# Patient Record
Sex: FEMALE
[De-identification: names, ages, dates, MRNs, and addresses within clinical notes are randomized per-mention and may not be internally consistent; named-entity substitution may affect disease eponyms.]

---

## 2023-09-18 PROBLEM — F31.9 BIPOLAR 1 DISORDER (HCC): Status: ACTIVE | Noted: 2022-09-23

## 2023-09-18 PROBLEM — R10.84 GENERALIZED ABDOMINAL PAIN: Status: ACTIVE | Noted: 2023-09-18

## 2023-09-18 RX ORDER — PSEUDOEPHEDRINE HCL 30 MG
100 TABLET ORAL DAILY
COMMUNITY
Start: 2022-08-19 | End: 2023-09-19

## 2023-09-18 RX ORDER — LAMOTRIGINE 100 MG/1
2 TABLET ORAL EVERY MORNING
COMMUNITY
Start: 2022-04-28

## 2023-09-18 RX ORDER — ETONOGESTREL AND ETHINYL ESTRADIOL 11.7; 2.7 MG/1; MG/1
1 INSERT, EXTENDED RELEASE VAGINAL
COMMUNITY
End: 2023-09-19

## 2023-09-18 RX ORDER — OLMESARTAN MEDOXOMIL 20 MG/1
20 TABLET ORAL DAILY
COMMUNITY
Start: 2020-09-28 | End: 2023-09-19

## 2023-09-18 RX ORDER — NORGESTIMATE AND ETHINYL ESTRADIOL 0.25-0.035
KIT ORAL
COMMUNITY
Start: 2021-06-01 | End: 2023-09-19

## 2023-09-18 RX ORDER — FLUTICASONE PROPIONATE 50 MCG
2 SPRAY, SUSPENSION (ML) NASAL DAILY
COMMUNITY
Start: 2019-03-08

## 2023-09-18 RX ORDER — HYDROCHLOROTHIAZIDE 25 MG/1
25 TABLET ORAL DAILY
COMMUNITY
Start: 2020-09-28 | End: 2023-09-19

## 2023-09-18 ASSESSMENT — ENCOUNTER SYMPTOMS: SHORTNESS OF BREATH: 0

## 2023-09-18 NOTE — ASSESSMENT & PLAN NOTE
Check STAT abdominal US  Referral to general surgery  Check CBC CMP TSH   Advised pt to cleanse umbilicus with antibacterial soap daily, and apply gauze to site

## 2023-09-19 ENCOUNTER — TELEPHONE (OUTPATIENT)
Facility: CLINIC | Age: 28
End: 2023-09-19

## 2023-09-19 ENCOUNTER — OFFICE VISIT (OUTPATIENT)
Facility: CLINIC | Age: 28
End: 2023-09-19
Payer: COMMERCIAL

## 2023-09-19 VITALS
DIASTOLIC BLOOD PRESSURE: 97 MMHG | OXYGEN SATURATION: 95 % | HEART RATE: 95 BPM | RESPIRATION RATE: 17 BRPM | SYSTOLIC BLOOD PRESSURE: 142 MMHG | BODY MASS INDEX: 42.75 KG/M2 | HEIGHT: 64 IN | WEIGHT: 250.4 LBS | TEMPERATURE: 97.9 F

## 2023-09-19 DIAGNOSIS — F31.9 BIPOLAR 1 DISORDER (HCC): ICD-10-CM

## 2023-09-19 DIAGNOSIS — Z76.89 ENCOUNTER TO ESTABLISH CARE: ICD-10-CM

## 2023-09-19 DIAGNOSIS — Z13.220 SCREENING FOR LIPID DISORDERS: ICD-10-CM

## 2023-09-19 DIAGNOSIS — I10 ESSENTIAL HYPERTENSION: ICD-10-CM

## 2023-09-19 DIAGNOSIS — Z23 NEEDS FLU SHOT: ICD-10-CM

## 2023-09-19 DIAGNOSIS — Z11.59 NEED FOR HEPATITIS C SCREENING TEST: ICD-10-CM

## 2023-09-19 DIAGNOSIS — N97.9 FEMALE FERTILITY PROBLEM: ICD-10-CM

## 2023-09-19 DIAGNOSIS — Z11.4 SCREENING FOR HIV (HUMAN IMMUNODEFICIENCY VIRUS): ICD-10-CM

## 2023-09-19 DIAGNOSIS — R10.84 GENERALIZED ABDOMINAL PAIN: Primary | ICD-10-CM

## 2023-09-19 PROCEDURE — 3077F SYST BP >= 140 MM HG: CPT

## 2023-09-19 PROCEDURE — 3080F DIAST BP >= 90 MM HG: CPT

## 2023-09-19 PROCEDURE — 1036F TOBACCO NON-USER: CPT

## 2023-09-19 PROCEDURE — 99205 OFFICE O/P NEW HI 60 MIN: CPT

## 2023-09-19 PROCEDURE — 90674 CCIIV4 VAC NO PRSV 0.5 ML IM: CPT

## 2023-09-19 PROCEDURE — G8417 CALC BMI ABV UP PARAM F/U: HCPCS

## 2023-09-19 PROCEDURE — 90471 IMMUNIZATION ADMIN: CPT

## 2023-09-19 PROCEDURE — G8427 DOCREV CUR MEDS BY ELIG CLIN: HCPCS

## 2023-09-19 RX ORDER — LABETALOL 200 MG/1
TABLET, FILM COATED ORAL
COMMUNITY
Start: 2023-09-13

## 2023-09-19 RX ORDER — LEVOTHYROXINE SODIUM 0.03 MG/1
TABLET ORAL
COMMUNITY
Start: 2023-09-14

## 2023-09-19 SDOH — ECONOMIC STABILITY: HOUSING INSECURITY
IN THE LAST 12 MONTHS, WAS THERE A TIME WHEN YOU DID NOT HAVE A STEADY PLACE TO SLEEP OR SLEPT IN A SHELTER (INCLUDING NOW)?: NO

## 2023-09-19 SDOH — ECONOMIC STABILITY: FOOD INSECURITY: WITHIN THE PAST 12 MONTHS, YOU WORRIED THAT YOUR FOOD WOULD RUN OUT BEFORE YOU GOT MONEY TO BUY MORE.: NEVER TRUE

## 2023-09-19 SDOH — ECONOMIC STABILITY: FOOD INSECURITY: WITHIN THE PAST 12 MONTHS, THE FOOD YOU BOUGHT JUST DIDN'T LAST AND YOU DIDN'T HAVE MONEY TO GET MORE.: NEVER TRUE

## 2023-09-19 SDOH — ECONOMIC STABILITY: INCOME INSECURITY: HOW HARD IS IT FOR YOU TO PAY FOR THE VERY BASICS LIKE FOOD, HOUSING, MEDICAL CARE, AND HEATING?: NOT VERY HARD

## 2023-09-19 ASSESSMENT — ENCOUNTER SYMPTOMS
ABDOMINAL PAIN: 1
VOMITING: 0
NAUSEA: 1
CONSTIPATION: 0

## 2023-09-19 ASSESSMENT — PATIENT HEALTH QUESTIONNAIRE - PHQ9
SUM OF ALL RESPONSES TO PHQ QUESTIONS 1-9: 14
SUM OF ALL RESPONSES TO PHQ QUESTIONS 1-9: 14
10. IF YOU CHECKED OFF ANY PROBLEMS, HOW DIFFICULT HAVE THESE PROBLEMS MADE IT FOR YOU TO DO YOUR WORK, TAKE CARE OF THINGS AT HOME, OR GET ALONG WITH OTHER PEOPLE: 1
5. POOR APPETITE OR OVEREATING: 1
3. TROUBLE FALLING OR STAYING ASLEEP: 3
7. TROUBLE CONCENTRATING ON THINGS, SUCH AS READING THE NEWSPAPER OR WATCHING TELEVISION: 2
9. THOUGHTS THAT YOU WOULD BE BETTER OFF DEAD, OR OF HURTING YOURSELF: 0
SUM OF ALL RESPONSES TO PHQ QUESTIONS 1-9: 14
1. LITTLE INTEREST OR PLEASURE IN DOING THINGS: 1
4. FEELING TIRED OR HAVING LITTLE ENERGY: 2
6. FEELING BAD ABOUT YOURSELF - OR THAT YOU ARE A FAILURE OR HAVE LET YOURSELF OR YOUR FAMILY DOWN: 2
8. MOVING OR SPEAKING SO SLOWLY THAT OTHER PEOPLE COULD HAVE NOTICED. OR THE OPPOSITE, BEING SO FIGETY OR RESTLESS THAT YOU HAVE BEEN MOVING AROUND A LOT MORE THAN USUAL: 1
SUM OF ALL RESPONSES TO PHQ QUESTIONS 1-9: 14
2. FEELING DOWN, DEPRESSED OR HOPELESS: 2
SUM OF ALL RESPONSES TO PHQ9 QUESTIONS 1 & 2: 3

## 2023-09-19 NOTE — PROGRESS NOTES
Alexandra Pozo is a 29 y.o. female is seen on 9/19/2023 for Establish Care, Abdominal Pain (Stomach pain started on 9/9/23, pain feels sore and in umbilical area. States belly button started to bleed on 9/10/23. Color fluctuates between being dark red to bright red. No warmthness in area. Pt has cramps intermittently. States area now has skin sticking out of belly button. pain is worse when bending down or picking up things. Currently treating area with antibacterial soap and cotton ball. Pain 7/10. States pain stays 10-15 minutes after lifting a 50 lb dog. Relieved when laying on left side), Depression, and Hypertension    Assessment & Plan:     1. Generalized abdominal pain  Assessment & Plan:  Check STAT abdominal US  Referral to general surgery  Check CBC CMP TSH   Advised pt to cleanse umbilicus with antibacterial soap daily, and apply gauze to site  Orders:  -     [facility] - [facility], General Surgery, [place]  -     CBC with Auto Differential; Future  -     US ABDOMEN COMPLETE; Future  2. Essential hypertension  Assessment & Plan:  BP acceptable, goal <130/80, continue regimen  3. Female fertility problem  Assessment & Plan:  Continue management per fertility specialist  4. Bipolar 1 disorder [facility])  Assessment & Plan:  Continue management per psychiatry  5. Screening for HIV (human immunodeficiency virus)  6. Need for hepatitis C screening test  7. Screening for lipid disorders  -     Comprehensive Metabolic Panel; Future  -     Lipid Panel; Future  8. Encounter to establish care  9. Needs flu shot  -     Influenza, FLUCELVAX, (age 10 mo+), IM, Preservative Free, 0.5 mL    Follow-up and Dispositions    Return in about 2 weeks (around 10/3/2023) for Physical, Abdominal pain, Lab results. Subjective:     HPI    Previous PCP: Dr. Mary Boas    Abdominal pain  Onset: 9/9/23  Location: belly button  Duration: constant  Characteristics: abdominal pain.  Felt sore, 9/10/23 belly button started
Obtained consent from patient. Per verbal order from NP  Sada Injection of flu administered. Verified by me and Jung Zavala that this is the correct immunization/injection. Patient observed for 15 minutes with no adverse reaction.
Sascha Anthony presents today for   Chief Complaint   Patient presents with    Establish Care    Abdominal Pain     Stomach pain started on 23, pain feels sore and in umbilical area. States belly button started to bleed on 9/10/23. Color fluctuates between being dark red to bright red. No warmthness in area. Pt has cramps intermittently. States area now has skin sticking out of belly button. pain is worse when bending down or picking up things. Currently treating area with antibacterial soap and cotton ball. Pain 7/10. States pain stays 10-15 minutes after lifting a 50 lb dog. Relieved when laying on left side    Depression    Hypertension       Is someone accompanying this pt? no    Is the patient using any DME equipment during OV? no    Depression Screenin/19/2023    10:34 AM   PHQ-9 Questionaire   Little interest or pleasure in doing things 1   Feeling down, depressed, or hopeless 2   Trouble falling or staying asleep, or sleeping too much 3   Feeling tired or having little energy 2   Poor appetite or overeating 1   Feeling bad about yourself - or that you are a failure or have let yourself or your family down 2   Trouble concentrating on things, such as reading the newspaper or watching television 2   Moving or speaking so slowly that other people could have noticed. Or the opposite - being so fidgety or restless that you have been moving around a lot more than usual 1   Thoughts that you would be better off dead, or of hurting yourself in some way 0   PHQ-9 Total Score 14   If you checked off any problems, how difficult have these problems made it for you to do your work, take care of things at home, or get along with other people? 1        ELMER 7-Anxiety        No data to display                 Learning Assessment:  Who is the primary learner? Patient    What is the preferred language for health care of the primary learner? ENGLISH    How does the primary learner prefer to learn new concepts?
42

## 2023-09-25 ENCOUNTER — HOSPITAL ENCOUNTER (OUTPATIENT)
Facility: HOSPITAL | Age: 28
Discharge: HOME | End: 2023-09-28
Payer: COMMERCIAL

## 2023-09-25 DIAGNOSIS — R10.84 GENERALIZED ABDOMINAL PAIN: ICD-10-CM

## 2023-09-25 PROCEDURE — 76700 US EXAM ABDOM COMPLETE: CPT

## 2023-10-02 PROBLEM — Z00.00 ANNUAL PHYSICAL EXAM: Status: ACTIVE | Noted: 2023-10-02

## 2023-10-04 ENCOUNTER — OFFICE VISIT (OUTPATIENT)
Age: 28
End: 2023-10-04
Payer: COMMERCIAL

## 2023-10-04 VITALS
BODY MASS INDEX: 43.02 KG/M2 | RESPIRATION RATE: 18 BRPM | SYSTOLIC BLOOD PRESSURE: 138 MMHG | HEART RATE: 86 BPM | HEIGHT: 64 IN | WEIGHT: 252 LBS | OXYGEN SATURATION: 97 % | TEMPERATURE: 98 F | DIASTOLIC BLOOD PRESSURE: 80 MMHG

## 2023-10-04 DIAGNOSIS — R19.8 UMBILICAL BLEEDING: Primary | ICD-10-CM

## 2023-10-04 PROCEDURE — G8427 DOCREV CUR MEDS BY ELIG CLIN: HCPCS | Performed by: SURGERY

## 2023-10-04 PROCEDURE — G8417 CALC BMI ABV UP PARAM F/U: HCPCS | Performed by: SURGERY

## 2023-10-04 PROCEDURE — 3078F DIAST BP <80 MM HG: CPT | Performed by: SURGERY

## 2023-10-04 PROCEDURE — 3074F SYST BP LT 130 MM HG: CPT | Performed by: SURGERY

## 2023-10-04 PROCEDURE — 99203 OFFICE O/P NEW LOW 30 MIN: CPT | Performed by: SURGERY

## 2023-10-04 PROCEDURE — 1036F TOBACCO NON-USER: CPT | Performed by: SURGERY

## 2023-10-04 PROCEDURE — G8482 FLU IMMUNIZE ORDER/ADMIN: HCPCS | Performed by: SURGERY

## 2023-10-04 NOTE — PROGRESS NOTES
Last Year: Never true     Ran Out of Food in the Last Year: Never true   Transportation Needs: Unknown (9/19/2023)    PRAPARE - Transportation     Lack of Transportation (Non-Medical): No   Housing Stability: Unknown (9/19/2023)    Housing Stability Vital Sign     Unstable Housing in the Last Year: No       Family History:  Family History   Problem Relation Age of Onset    Heart Failure Mother     Diabetes Mother     Hypertension Mother     Stroke Mother     Coronary Art Dis Mother     Lupus Mother         Blood clot disorder           US ABDOMEN COMPLETE  Narrative: US ABDOMEN COMPLETE    Indication: Generalized abdominal pain    Comparison: None    Technique: Real-time ultrasonography of the abdomen was performed. Images were  obtained in multiple planes. Findings:   Visualized portions of the pancreas are unremarkable. Liver is increased in echogenicity without discrete mass with focal fatty  sparing adjacent to the gallbladder. The main portal vein is normal in size. Color-flow imaging of the portal vein demonstrates appropriate flow to the liver  without thrombosis. No intra or extrahepatic biliary dilatation. Common bile  duct is normal in size. Gallbladder is normal without stones, pericholecystic fluid or wall thickening. No tenderness is elicited while imaging over the gallbladder. Visualized portions on the aorta, IVC, and iliac vessels are patent and  unremarkable. Aorta tapers normally. Spleen is unremarkable and normal in size. Right kidney demonstrates normal echotexture and measures 10.9 cm. No  hydronephrosis, stone or mass. Left kidney demonstrates normal echotexture and measures 11.4 cm. No  hydronephrosis, stone or mass. No free fluid in the abdomen. Dedicated imaging of the patient's area of concern in the umbilical region  demonstrates no abnormality.   Impression: Increased echotexture of the liver which can be seen in hepatic steatosis or  hepatic

## 2023-10-05 ASSESSMENT — ENCOUNTER SYMPTOMS
VOMITING: 0
SHORTNESS OF BREATH: 0
CHEST TIGHTNESS: 0
ABDOMINAL PAIN: 1
NAUSEA: 0

## 2023-11-01 PROBLEM — Z00.00 ANNUAL PHYSICAL EXAM: Status: RESOLVED | Noted: 2023-10-02 | Resolved: 2023-11-01

## 2024-02-28 ENCOUNTER — TELEPHONE (OUTPATIENT)
Facility: CLINIC | Age: 29
End: 2024-02-28

## 2024-02-28 PROBLEM — M53.3 PAIN IN THE COCCYX: Status: ACTIVE | Noted: 2024-02-28

## 2024-02-28 LAB
A/G RATIO: 1.2 RATIO (ref 1.1–2.6)
ALBUMIN SERPL-MCNC: 4.3 G/DL (ref 3.5–5)
ALP BLD-CCNC: 108 U/L (ref 25–115)
ALT SERPL-CCNC: 33 U/L (ref 5–40)
ANION GAP SERPL CALCULATED.3IONS-SCNC: 13 MMOL/L (ref 3–15)
AST SERPL-CCNC: 26 U/L (ref 10–37)
BASOPHILS # BLD: 0 % (ref 0–2)
BASOPHILS ABSOLUTE: 0 K/UL (ref 0–0.2)
BILIRUB SERPL-MCNC: 0.6 MG/DL (ref 0.2–1.2)
BUN BLDV-MCNC: 13 MG/DL (ref 6–22)
CALCIUM SERPL-MCNC: 9.2 MG/DL (ref 8.4–10.5)
CHLORIDE BLD-SCNC: 99 MMOL/L (ref 98–110)
CHOLESTEROL/HDL RATIO: 4.3 (ref 0–5)
CHOLESTEROL: 182 MG/DL (ref 110–200)
CO2: 25 MMOL/L (ref 20–32)
CREAT SERPL-MCNC: 0.8 MG/DL (ref 0.5–1.2)
EOSINOPHIL # BLD: 3 % (ref 0–6)
EOSINOPHILS ABSOLUTE: 0.3 K/UL (ref 0–0.5)
GLOBULIN: 3.5 G/DL (ref 2–4)
GLOMERULAR FILTRATION RATE: >60 ML/MIN/1.73 SQ.M.
GLUCOSE: 115 MG/DL (ref 70–99)
HCT VFR BLD CALC: 41.3 % (ref 35.1–46.5)
HDLC SERPL-MCNC: 42 MG/DL
HEMOGLOBIN: 14 G/DL (ref 11.7–15.5)
LDL CHOLESTEROL CALCULATED: 94 MG/DL (ref 50–99)
LDL/HDL RATIO: 2.3
LYMPHOCYTES # BLD: 16 % (ref 20–45)
LYMPHOCYTES ABSOLUTE: 1.7 K/UL (ref 1–4.8)
MCH RBC QN AUTO: 29 PG (ref 26–34)
MCHC RBC AUTO-ENTMCNC: 34 G/DL (ref 31–36)
MCV RBC AUTO: 86 FL (ref 80–99)
MONOCYTES ABSOLUTE: 0.5 K/UL (ref 0.1–1)
MONOCYTES: 5 % (ref 3–12)
NEUTROPHILS ABSOLUTE: 8.1 K/UL (ref 1.8–7.7)
NEUTROPHILS: 76 % (ref 40–75)
NON-HDL CHOLESTEROL: 140 MG/DL
PDW BLD-RTO: 12.4 % (ref 10–15.5)
PLATELET # BLD: 380 K/UL (ref 140–440)
PMV BLD AUTO: 10.3 FL (ref 9–13)
POTASSIUM SERPL-SCNC: 3.3 MMOL/L (ref 3.5–5.5)
RBC: 4.83 M/UL (ref 3.8–5.2)
SODIUM BLD-SCNC: 137 MMOL/L (ref 133–145)
TOTAL PROTEIN: 7.8 G/DL (ref 6.4–8.3)
TRIGL SERPL-MCNC: 226 MG/DL (ref 40–149)
VLDLC SERPL CALC-MCNC: 45 MG/DL (ref 8–30)
WBC: 10.6 K/UL (ref 4–11)

## 2024-02-28 ASSESSMENT — ENCOUNTER SYMPTOMS: SHORTNESS OF BREATH: 0

## 2024-02-28 NOTE — TELEPHONE ENCOUNTER
S Situation:   PT with increasing tailbone pain went to urgent care was told  her tailbone was broken and then pa told her it wasn't      B Background: pt was out of town visiting friends . Pts friend and  landed on her after fall .      A Assessment: see below        ONSET- several days ago Saturday    LOCATION- tailbone       DURATION- constant    CHARACTERISTICS: stabbing and burning pain that radiates to the hip and knees .      ASSOCIATED SYMPTOMS: pain in hip and pain      AGGRAVATING FACTORS: sitting laying and turning certain positions    RELIEVING FACTORS: laying on side helps      TREATMENT: went to urgent care  but was given confusing results .     R Recommendation/Action: Scheduled office visit  tomorrow at 0930

## 2024-02-29 ENCOUNTER — OFFICE VISIT (OUTPATIENT)
Facility: CLINIC | Age: 29
End: 2024-02-29
Payer: COMMERCIAL

## 2024-02-29 VITALS
DIASTOLIC BLOOD PRESSURE: 146 MMHG | TEMPERATURE: 98.2 F | RESPIRATION RATE: 18 BRPM | SYSTOLIC BLOOD PRESSURE: 170 MMHG | OXYGEN SATURATION: 97 % | HEART RATE: 97 BPM | BODY MASS INDEX: 40.97 KG/M2 | WEIGHT: 240 LBS | HEIGHT: 64 IN

## 2024-02-29 DIAGNOSIS — M53.3 PAIN IN THE COCCYX: Primary | ICD-10-CM

## 2024-02-29 DIAGNOSIS — R73.01 IMPAIRED FASTING GLUCOSE: ICD-10-CM

## 2024-02-29 DIAGNOSIS — E78.1 HYPERTRIGLYCERIDEMIA: ICD-10-CM

## 2024-02-29 DIAGNOSIS — E66.01 OBESITY, MORBID, BMI 40.0-49.9 (HCC): ICD-10-CM

## 2024-02-29 DIAGNOSIS — I10 ESSENTIAL HYPERTENSION: ICD-10-CM

## 2024-02-29 DIAGNOSIS — E87.6 HYPOKALEMIA: ICD-10-CM

## 2024-02-29 PROCEDURE — G8482 FLU IMMUNIZE ORDER/ADMIN: HCPCS

## 2024-02-29 PROCEDURE — 3080F DIAST BP >= 90 MM HG: CPT

## 2024-02-29 PROCEDURE — G8427 DOCREV CUR MEDS BY ELIG CLIN: HCPCS

## 2024-02-29 PROCEDURE — 3077F SYST BP >= 140 MM HG: CPT

## 2024-02-29 PROCEDURE — G8417 CALC BMI ABV UP PARAM F/U: HCPCS

## 2024-02-29 PROCEDURE — 1036F TOBACCO NON-USER: CPT

## 2024-02-29 PROCEDURE — 99215 OFFICE O/P EST HI 40 MIN: CPT

## 2024-02-29 RX ORDER — LAMOTRIGINE 150 MG/1
150 TABLET ORAL
COMMUNITY
Start: 2024-01-23

## 2024-02-29 RX ORDER — POTASSIUM CHLORIDE 750 MG/1
10 TABLET, EXTENDED RELEASE ORAL 2 TIMES DAILY
Qty: 6 TABLET | Refills: 0 | Status: SHIPPED | OUTPATIENT
Start: 2024-02-29 | End: 2024-03-03

## 2024-02-29 RX ORDER — LABETALOL 200 MG/1
200 TABLET, FILM COATED ORAL 2 TIMES DAILY
Qty: 180 TABLET | Refills: 1 | Status: SHIPPED | OUTPATIENT
Start: 2024-02-29

## 2024-02-29 SDOH — ECONOMIC STABILITY: FOOD INSECURITY: WITHIN THE PAST 12 MONTHS, THE FOOD YOU BOUGHT JUST DIDN'T LAST AND YOU DIDN'T HAVE MONEY TO GET MORE.: NEVER TRUE

## 2024-02-29 SDOH — ECONOMIC STABILITY: INCOME INSECURITY: HOW HARD IS IT FOR YOU TO PAY FOR THE VERY BASICS LIKE FOOD, HOUSING, MEDICAL CARE, AND HEATING?: NOT HARD AT ALL

## 2024-02-29 SDOH — ECONOMIC STABILITY: FOOD INSECURITY: WITHIN THE PAST 12 MONTHS, YOU WORRIED THAT YOUR FOOD WOULD RUN OUT BEFORE YOU GOT MONEY TO BUY MORE.: NEVER TRUE

## 2024-02-29 ASSESSMENT — PATIENT HEALTH QUESTIONNAIRE - PHQ9
7. TROUBLE CONCENTRATING ON THINGS, SUCH AS READING THE NEWSPAPER OR WATCHING TELEVISION: 3
5. POOR APPETITE OR OVEREATING: 0
SUM OF ALL RESPONSES TO PHQ9 QUESTIONS 1 & 2: 2
10. IF YOU CHECKED OFF ANY PROBLEMS, HOW DIFFICULT HAVE THESE PROBLEMS MADE IT FOR YOU TO DO YOUR WORK, TAKE CARE OF THINGS AT HOME, OR GET ALONG WITH OTHER PEOPLE: 1
SUM OF ALL RESPONSES TO PHQ QUESTIONS 1-9: 11
SUM OF ALL RESPONSES TO PHQ QUESTIONS 1-9: 11
9. THOUGHTS THAT YOU WOULD BE BETTER OFF DEAD, OR OF HURTING YOURSELF: 0
4. FEELING TIRED OR HAVING LITTLE ENERGY: 2
1. LITTLE INTEREST OR PLEASURE IN DOING THINGS: 1
2. FEELING DOWN, DEPRESSED OR HOPELESS: 1
SUM OF ALL RESPONSES TO PHQ QUESTIONS 1-9: 11
6. FEELING BAD ABOUT YOURSELF - OR THAT YOU ARE A FAILURE OR HAVE LET YOURSELF OR YOUR FAMILY DOWN: 0
8. MOVING OR SPEAKING SO SLOWLY THAT OTHER PEOPLE COULD HAVE NOTICED. OR THE OPPOSITE, BEING SO FIGETY OR RESTLESS THAT YOU HAVE BEEN MOVING AROUND A LOT MORE THAN USUAL: 1
SUM OF ALL RESPONSES TO PHQ QUESTIONS 1-9: 11
3. TROUBLE FALLING OR STAYING ASLEEP: 3

## 2024-02-29 ASSESSMENT — ANXIETY QUESTIONNAIRES
IF YOU CHECKED OFF ANY PROBLEMS ON THIS QUESTIONNAIRE, HOW DIFFICULT HAVE THESE PROBLEMS MADE IT FOR YOU TO DO YOUR WORK, TAKE CARE OF THINGS AT HOME, OR GET ALONG WITH OTHER PEOPLE: SOMEWHAT DIFFICULT
5. BEING SO RESTLESS THAT IT IS HARD TO SIT STILL: 1
1. FEELING NERVOUS, ANXIOUS, OR ON EDGE: 2
2. NOT BEING ABLE TO STOP OR CONTROL WORRYING: 2
GAD7 TOTAL SCORE: 12
3. WORRYING TOO MUCH ABOUT DIFFERENT THINGS: 3
4. TROUBLE RELAXING: 2
6. BECOMING EASILY ANNOYED OR IRRITABLE: 1
7. FEELING AFRAID AS IF SOMETHING AWFUL MIGHT HAPPEN: 1

## 2024-02-29 ASSESSMENT — ENCOUNTER SYMPTOMS: BACK PAIN: 1

## 2024-02-29 NOTE — ASSESSMENT & PLAN NOTE
Elevated d/t noncompliance, ran out of medications  BP goal <130/80  Restart labetalol 200 mg BID  Re-evaluate in 6 weeks

## 2024-02-29 NOTE — PROGRESS NOTES
Chief Complaint   Patient presents with    Tailbone Pain     Pt c/o timwendydeana sever tailbone pain \"I fell onto a concerete slab while on vacation\" onset 5 days ago. Located on tailbone. Pain is pulsing aching and burning when laying on it. Went to on [facility] and Rx prednisone and a medication that starts with a C as a muscle relaxer. Aggravated with pooping, coughing, laughing, standing and laying down. Relived a bit when sitting on ankles. Treating with medications from [facility] and tylenol. XR tech informed pt her tailbone was broken.     Hypokalemia    Hyperlipidemia    Hypertension     Assessment & Plan:     1. Pain in the coccyx  Assessment & Plan:  Check xray of coccyx   Advised pt to start prednisone taper, prescribed from urgent care, and continue PRN flexeril  Referral to spine specialists for management  Orders:  -     [facility] - [facility], [place] ([address])  -     XR SACRUM COCCYX (MIN 2 VIEWS); Future  2. Essential hypertension  Assessment & Plan:  Elevated d/t noncompliance, ran out of medications  BP goal <130/80  Restart labetalol 200 mg BID  Re-evaluate in 6 weeks  Orders:  -     labetalol (NORMODYNE) 200 MG tablet; Take 1 tablet by mouth 2 times daily, Disp-180 tablet, R-1Normal  3. Hypokalemia  Assessment & Plan:  Asymptomatic  Start potassium 10 meq BID x3 days  Repeat BMP in 1 week   Orders:  -     Basic Metabolic Panel; Future  -     potassium chloride (KLOR-CON M) 10 MEQ extended release tablet; Take 1 tablet by mouth 2 times daily for 3 days, Disp-6 tablet, R-0Normal  4. Hypertriglyceridemia  Assessment & Plan:  Advised pt on lifestyle modifications  Repeat cmp and lipid panel in 3 months  Orders:  -     Comprehensive Metabolic Panel; Future  -     Lipid Panel; Future  5. Impaired fasting glucose  Assessment & Plan:  Advised pt on lifestyle modifications  Check POC A1c at next appt, to r/o prediabetes, diabetes   Orders:  -     Hemoglobin A1C; Future  6. 
Never done    HPV vaccine (3 - 3-dose series) 10/06/2015    COVID-19 Vaccine (4 - 2023-24 season) 09/01/2023   .      \"Have you been to the ER, urgent care clinic since your last visit?  Hospitalized since your last visit?\"    YES - When: approximately 1 days ago.  Where and Why: [facility] for tailbone pain .    “Have you seen or consulted any other health care providers outside of [facility] since your last visit?”    NO

## 2024-03-06 ENCOUNTER — TELEPHONE (OUTPATIENT)
Facility: CLINIC | Age: 29
End: 2024-03-06

## 2024-08-28 DIAGNOSIS — I10 ESSENTIAL HYPERTENSION: ICD-10-CM

## 2024-09-04 RX ORDER — LABETALOL 200 MG/1
200 TABLET, FILM COATED ORAL 2 TIMES DAILY
Qty: 60 TABLET | Refills: 0 | Status: SHIPPED | OUTPATIENT
Start: 2024-09-04

## 2024-09-04 NOTE — TELEPHONE ENCOUNTER
Last seen 2/29/2024   Last labs 2/28/24  Last filled  2/29/24  Next appointment Visit date not found     Lab Results   Component Value Date     02/28/2024    K 3.3 (L) 02/28/2024    CL 99 02/28/2024    CO2 25 02/28/2024    BUN 13 02/28/2024    CREATININE 0.8 02/28/2024    GLUCOSE 115 (H) 02/28/2024    CALCIUM 9.2 02/28/2024    BILITOT 0.6 02/28/2024    ALKPHOS 108 02/28/2024    AST 26 02/28/2024    ALT 33 02/28/2024    LABGLOM >60.0 02/28/2024    AGRATIO 1.2 02/28/2024    GLOB 3.5 02/28/2024

## 2024-09-04 NOTE — TELEPHONE ENCOUNTER
: please call pt and schedule f/u appt, advised of fasting labs that are due.     Return in about 6 weeks (around 4/11/2024) for Coccyx pain.

## 2024-09-20 ENCOUNTER — TELEPHONE (OUTPATIENT)
Facility: CLINIC | Age: 29
End: 2024-09-20

## 2024-09-20 NOTE — TELEPHONE ENCOUNTER
Outgoing call LVM to advise pt of cancelled appt on 9/24/2024 at 4:00 due to provider not being in office, requested pt callback to reschedule.

## 2024-10-09 DIAGNOSIS — I10 ESSENTIAL HYPERTENSION: ICD-10-CM

## 2024-10-09 RX ORDER — LABETALOL 200 MG/1
200 TABLET, FILM COATED ORAL 2 TIMES DAILY
Qty: 60 TABLET | Refills: 0 | Status: SHIPPED | OUTPATIENT
Start: 2024-10-09

## 2024-10-09 NOTE — TELEPHONE ENCOUNTER
: please call pt and reschedule appt with provider. Advise pt she must come newly scheduled appt. Thank you!     Return in about 6 weeks (around 4/11/2024) for Coccyx pain.

## 2024-10-09 NOTE — TELEPHONE ENCOUNTER
Labs:   Lab Results   Component Value Date     02/28/2024    K 3.3 (L) 02/28/2024    CL 99 02/28/2024    CO2 25 02/28/2024    BUN 13 02/28/2024    CREATININE 0.8 02/28/2024    GLUCOSE 115 (H) 02/28/2024    CALCIUM 9.2 02/28/2024    BILITOT 0.6 02/28/2024    ALKPHOS 108 02/28/2024    AST 26 02/28/2024    ALT 33 02/28/2024    LABGLOM >60.0 02/28/2024    AGRATIO 1.2 02/28/2024    GLOB 3.5 02/28/2024        Additional Notes: 30 day supply only

## 2024-11-17 DIAGNOSIS — I10 ESSENTIAL HYPERTENSION: ICD-10-CM

## 2024-11-19 RX ORDER — LABETALOL 200 MG/1
200 TABLET, FILM COATED ORAL 2 TIMES DAILY
Qty: 60 TABLET | Refills: 0 | OUTPATIENT
Start: 2024-11-19

## 2024-11-19 NOTE — TELEPHONE ENCOUNTER
Labs:   Lab Results   Component Value Date     02/28/2024    K 3.3 (L) 02/28/2024    CL 99 02/28/2024    CO2 25 02/28/2024    BUN 13 02/28/2024    CREATININE 0.8 02/28/2024    GLUCOSE 115 (H) 02/28/2024    CALCIUM 9.2 02/28/2024    BILITOT 0.6 02/28/2024    ALKPHOS 108 02/28/2024    AST 26 02/28/2024    ALT 33 02/28/2024    LABGLOM >60.0 02/28/2024    AGRATIO 1.2 02/28/2024    GLOB 3.5 02/28/2024        Additional Notes:

## 2025-01-14 ENCOUNTER — TELEPHONE (OUTPATIENT)
Facility: CLINIC | Age: 30
End: 2025-01-14

## 2025-01-14 DIAGNOSIS — I10 ESSENTIAL HYPERTENSION: ICD-10-CM
